# Patient Record
Sex: MALE | URBAN - NONMETROPOLITAN AREA
[De-identification: names, ages, dates, MRNs, and addresses within clinical notes are randomized per-mention and may not be internally consistent; named-entity substitution may affect disease eponyms.]

---

## 2021-08-30 ENCOUNTER — TELEPHONE (OUTPATIENT)
Dept: FAMILY MEDICINE CLINIC | Age: 31
End: 2021-08-30

## 2021-09-02 NOTE — TELEPHONE ENCOUNTER
Pt called yesterday left message that she wants to make an appt. Called back yesterday, busy. Called this morning. Busy.

## 2021-09-03 NOTE — TELEPHONE ENCOUNTER
Finally got thru to leave a voice mail. I lm stating since he is 27 yr old he can call to make an appointment for a new pt. If scheduling cannot find an opening they may put him in a slot and then send me a message to move him up if applicable.